# Patient Record
Sex: MALE | Race: WHITE | NOT HISPANIC OR LATINO | Employment: FULL TIME | URBAN - METROPOLITAN AREA
[De-identification: names, ages, dates, MRNs, and addresses within clinical notes are randomized per-mention and may not be internally consistent; named-entity substitution may affect disease eponyms.]

---

## 2019-06-05 NOTE — PROGRESS NOTES
This note was created by combination of typed  and Dragon dictation.  Transcription errors may be present.  If there are any questions, please contact me.    Assessment & Plan:   Essential hypertension  -stable on lisinopril 20  -     lisinopril (PRINIVIL,ZESTRIL) 20 MG tablet; Take 1 tablet (20 mg total) by mouth once daily.  Dispense: 90 tablet; Refill: 3    Male erectile disorder  -trial of sildenafil higher dose can take up to 5 at a time if needed. SE profile discussed.  -     sildenafil (REVATIO) 20 mg Tab; 1-3 tablets 1 hour prior; up to 5 at a time.  Dispense: 30 tablet; Refill: 11    Smoking 1/2 PPD x 10 years  -discussed treatment options. He's wary of chantix.  Discussed nicotine patch and he's interested.  rx sent.  -     nicotine (NICODERM CQ) 14 mg/24 hr; Place 1 patch onto the skin once daily.  Dispense: 30 patch; Refill: 5  -     nicotine, polacrilex, (NICORETTE) 2 mg Gum; Take 1 each (2 mg total) by mouth as needed.  Dispense: 30 each; Refill: 5    Normal physical exam  -future labs ordered  -     Cancel: CBC auto differential; Future; Expected date: 06/06/2019  -     Cancel: Comprehensive metabolic panel; Future; Expected date: 06/06/2019  -     Cancel: Hemoglobin A1c; Future; Expected date: 06/06/2019  -     Cancel: Lipid panel; Future; Expected date: 06/06/2019  -     Cancel: PSA, Screening; Future; Expected date: 06/06/2019  -     Cancel: TSH; Future; Expected date: 06/06/2019  -     CBC auto differential; Future; Expected date: 06/06/2019  -     Comprehensive metabolic panel; Future; Expected date: 06/06/2019  -     Lipid panel; Future; Expected date: 06/06/2019  -     Hemoglobin A1c; Future; Expected date: 06/06/2019  -     TSH; Future; Expected date: 06/06/2019    Other orders  -     Cancel: Hepatitis C antibody; Future; Expected date: 06/06/2019    eROI to previous PCP    Medications Discontinued During This Encounter   Medication Reason    lisinopril (PRINIVIL,ZESTRIL) 20 MG  tablet Reorder       meds sent this encounter:  Medications Ordered This Encounter   Medications    lisinopril (PRINIVIL,ZESTRIL) 20 MG tablet     Sig: Take 1 tablet (20 mg total) by mouth once daily.     Dispense:  90 tablet     Refill:  3    nicotine (NICODERM CQ) 14 mg/24 hr     Sig: Place 1 patch onto the skin once daily.     Dispense:  30 patch     Refill:  5    nicotine, polacrilex, (NICORETTE) 2 mg Gum     Sig: Take 1 each (2 mg total) by mouth as needed.     Dispense:  30 each     Refill:  5    sildenafil (REVATIO) 20 mg Tab     Si-3 tablets 1 hour prior; up to 5 at a time.     Dispense:  30 tablet     Refill:  11     Order Specific Question:   For: 1. benefits investigation, prior auth. and appeals; 2. patient financial assistance; and 3. patient education and medication management send to [facility] to fill the prescription.     Answer:   No, I will select a different specialty pharmacy       Follow Up: No follow-ups on file. PEx 1 year pre PEx lab ordered    Subjective:     Chief Complaint   Patient presents with    Annual Exam       HPI  Otis Osorio is a 54 y.o. male, last appointment with this clinic was Visit date not found.    NP; recently moved from [facility]. Formerly Dr. Candace Mota    HTN, lisinopril x about 3 years. Has home readings - 120s typically.  Denies SE of medication. Takes regularly.  Exercises regularly.  Smoking 1/2 PPD x 10 years.  Has chantix but never took it. No chronic cough or chest pain or dyspnea.  ED, sildenafil 20 mg ineffective. Has not tried higher dose.   No hx of lipid disorder.  Hx of stool card testing normal. Done recently.    Answers for HPI/ROS submitted by the patient on 2019   activity change: No  unexpected weight change: No  rhinorrhea: No  trouble swallowing: No  visual disturbance: No  chest tightness: No  polyuria: No  difficulty urinating: No  joint swelling: No  arthralgias: No  confusion: No  dysphoric mood:  No      Patient Care Team:  Primary Doctor No as PCP - General    Patient Active Problem List    Diagnosis Date Noted    Essential hypertension 06/06/2019    Male erectile disorder 06/06/2019    Smoking 1/2 PPD x 10 years 06/06/2019       PAST MEDICAL HISTORY:  Past Medical History:   Diagnosis Date    Hypertension        PAST SURGICAL HISTORY:  History reviewed. No pertinent surgical history.    Family History   Problem Relation Age of Onset    Heart disease Mother     Cancer Father         stomach    No Known Problems Sister     Hypertension Brother     Hypertension Brother     Hypertension Brother     No Known Problems Son     No Known Problems Daughter        SOCIAL HISTORY:  Social History     Socioeconomic History    Marital status: Single     Spouse name: Not on file    Number of children: Not on file    Years of education: Not on file    Highest education level: Not on file   Occupational History    Occupation: manager     Employer: [employer]   Social Needs    Financial resource strain: Not hard at all    Food insecurity:     Worry: Never true     Inability: Never true    Transportation needs:     Medical: No     Non-medical: No   Tobacco Use    Smoking status: Current Every Day Smoker   Substance and Sexual Activity    Alcohol use: Never     Frequency: Never     Drinks per session: Patient refused     Binge frequency: Never    Drug use: Never    Sexual activity: Yes   Lifestyle    Physical activity:     Days per week: 5 days     Minutes per session: 150+ min    Stress: Not at all   Relationships    Social connections:     Talks on phone: More than three times a week     Gets together: Twice a week     Attends Protestant service: Not on file     Active member of club or organization: No     Attends meetings of clubs or organizations: Never     Relationship status:    Other Topics Concern    Not on file   Social History Narrative    Not on file  "      ALLERGIES AND MEDICATIONS: updated and reviewed.  Review of patient's allergies indicates:   Allergen Reactions    Penicillins      Current Outpatient Medications   Medication Sig Dispense Refill    lisinopril (PRINIVIL,ZESTRIL) 20 MG tablet        No current facility-administered medications for this visit.        Review of Systems   HENT: Negative for hearing loss.    Eyes: Negative for discharge.   Respiratory: Negative for wheezing.    Cardiovascular: Negative for chest pain and palpitations.   Gastrointestinal: Negative for blood in stool, constipation, diarrhea and vomiting.   Genitourinary: Negative for hematuria and urgency.   Musculoskeletal: Negative for neck pain.   Neurological: Negative for weakness and headaches.   Endo/Heme/Allergies: Negative for polydipsia.       Objective:   Physical Exam  Vitals:    06/06/19 0810   Pulse: 74   SpO2: 98%   Weight: 69.9 kg (154 lb)   Height: 5' 7" (1.702 m)    Body mass index is 24.12 kg/m².  Weight: 69.9 kg (154 lb)   Height: 5' 7" (170.2 cm)     Physical Exam   Constitutional: He is oriented to person, place, and time. He appears well-developed and well-nourished.   HENT:   Right Ear: Tympanic membrane and external ear normal.   Left Ear: Tympanic membrane and external ear normal.   Nose: Nose normal.   Mouth/Throat: Oropharynx is clear and moist.   Eyes: EOM are normal.   Neck: Trachea normal. Carotid bruit is not present. No thyroid mass and no thyromegaly present.   Cardiovascular: Normal rate, regular rhythm, S1 normal, S2 normal, normal heart sounds and intact distal pulses.   No murmur heard.  Pulmonary/Chest: Effort normal and breath sounds normal.   Abdominal: Soft. He exhibits no mass. There is no splenomegaly or hepatomegaly. There is no tenderness.   Musculoskeletal: He exhibits no edema or deformity.   Lymphadenopathy:     He has no cervical adenopathy.     He has no axillary adenopathy.   Neurological: He is alert and oriented to person, " place, and time. He has normal strength and normal reflexes. No cranial nerve deficit or sensory deficit.   Skin: Skin is warm and dry. No rash (on exposed skin) noted.   On exposed skin   Psychiatric: He has a normal mood and affect. His speech is normal and behavior is normal. Thought content normal.

## 2019-06-06 ENCOUNTER — OFFICE VISIT (OUTPATIENT)
Dept: FAMILY MEDICINE | Facility: CLINIC | Age: 54
End: 2019-06-06
Payer: COMMERCIAL

## 2019-06-06 VITALS
WEIGHT: 154 LBS | HEART RATE: 74 BPM | SYSTOLIC BLOOD PRESSURE: 112 MMHG | OXYGEN SATURATION: 98 % | TEMPERATURE: 98 F | DIASTOLIC BLOOD PRESSURE: 80 MMHG | BODY MASS INDEX: 24.17 KG/M2 | HEIGHT: 67 IN

## 2019-06-06 DIAGNOSIS — Z00.00 NORMAL PHYSICAL EXAM: ICD-10-CM

## 2019-06-06 DIAGNOSIS — F17.200 SMOKING: ICD-10-CM

## 2019-06-06 DIAGNOSIS — N52.9 MALE ERECTILE DISORDER: ICD-10-CM

## 2019-06-06 DIAGNOSIS — I10 ESSENTIAL HYPERTENSION: Primary | ICD-10-CM

## 2019-06-06 PROCEDURE — 99999 PR PBB SHADOW E&M-EST. PATIENT-LVL III: CPT | Mod: PBBFAC,,, | Performed by: INTERNAL MEDICINE

## 2019-06-06 PROCEDURE — 99203 OFFICE O/P NEW LOW 30 MIN: CPT | Mod: S$GLB,,, | Performed by: INTERNAL MEDICINE

## 2019-06-06 PROCEDURE — 3074F SYST BP LT 130 MM HG: CPT | Mod: CPTII,S$GLB,, | Performed by: INTERNAL MEDICINE

## 2019-06-06 PROCEDURE — 3008F BODY MASS INDEX DOCD: CPT | Mod: CPTII,S$GLB,, | Performed by: INTERNAL MEDICINE

## 2019-06-06 PROCEDURE — 3079F PR MOST RECENT DIASTOLIC BLOOD PRESSURE 80-89 MM HG: ICD-10-PCS | Mod: CPTII,S$GLB,, | Performed by: INTERNAL MEDICINE

## 2019-06-06 PROCEDURE — 99203 PR OFFICE/OUTPT VISIT, NEW, LEVL III, 30-44 MIN: ICD-10-PCS | Mod: S$GLB,,, | Performed by: INTERNAL MEDICINE

## 2019-06-06 PROCEDURE — 99999 PR PBB SHADOW E&M-EST. PATIENT-LVL III: ICD-10-PCS | Mod: PBBFAC,,, | Performed by: INTERNAL MEDICINE

## 2019-06-06 PROCEDURE — 3079F DIAST BP 80-89 MM HG: CPT | Mod: CPTII,S$GLB,, | Performed by: INTERNAL MEDICINE

## 2019-06-06 PROCEDURE — 3074F PR MOST RECENT SYSTOLIC BLOOD PRESSURE < 130 MM HG: ICD-10-PCS | Mod: CPTII,S$GLB,, | Performed by: INTERNAL MEDICINE

## 2019-06-06 PROCEDURE — 3008F PR BODY MASS INDEX (BMI) DOCUMENTED: ICD-10-PCS | Mod: CPTII,S$GLB,, | Performed by: INTERNAL MEDICINE

## 2019-06-06 RX ORDER — IBUPROFEN 200 MG
1 TABLET ORAL DAILY
Qty: 30 PATCH | Refills: 5 | Status: SHIPPED | OUTPATIENT
Start: 2019-06-06

## 2019-06-06 RX ORDER — MICONAZOLE NITRATE 2 %
2 CREAM (GRAM) TOPICAL
Qty: 30 EACH | Refills: 5 | Status: SHIPPED | OUTPATIENT
Start: 2019-06-06

## 2019-06-06 RX ORDER — LISINOPRIL 20 MG/1
20 TABLET ORAL DAILY
Qty: 90 TABLET | Refills: 3 | Status: SHIPPED | OUTPATIENT
Start: 2019-06-06 | End: 2019-09-13 | Stop reason: SDUPTHER

## 2019-06-06 RX ORDER — LISINOPRIL 20 MG/1
TABLET ORAL
COMMUNITY
End: 2019-06-06 | Stop reason: SDUPTHER

## 2019-06-06 RX ORDER — SILDENAFIL CITRATE 20 MG/1
TABLET ORAL
Qty: 30 TABLET | Refills: 11 | Status: SHIPPED | OUTPATIENT
Start: 2019-06-06 | End: 2020-06-08 | Stop reason: SDUPTHER

## 2019-06-06 NOTE — LETTER
June 6, 2019    Dr. Candace Mota             [facility]  [address] [place] [address]  [place] [address]  Phone: [phone]  Fax: [phone] June 6, 2019     Patient: Otis Osorio    YOB: 1965   Date of Visit: 6/6/2019     To Whom It May Concern:    We are seeing Otis Osorio in the clinic at [facility].  Saji Butler MD is the patient's PCP.  She/He has an outstanding lab/procedure at the time we reviewed his/her chart.  To help with our Health Maintenance records will you please supply the following report(s)       (  )  MAMMOGRAM                                             (  )  COLONOSCOPY      (  )  PAP SMEAR                                                 (  )  OUTSIDE LAB RESULTS     (  )  DEXA SCAN                                                 (  )  EYE EXAM            (  )  FOOT EXAM                                                 ( x )  ENTIRE RECORD     (  )  OUTSIDE IMMUNIZATIONS                        (  )  _______________         Please Fax to [facility], Marvin P Dair, [phone]     If any questions please contact Azra at [phone]

## 2019-06-07 DIAGNOSIS — Z12.11 COLON CANCER SCREENING: ICD-10-CM

## 2019-07-12 DIAGNOSIS — Z11.59 NEED FOR HEPATITIS C SCREENING TEST: ICD-10-CM

## 2019-09-09 NOTE — PROGRESS NOTES
This note was created by combination of typed  and Dragon dictation.  Transcription errors may be present.  If there are any questions, please contact me.    Assessment & Plan:   Plantar fasciitis of left foot  -recommended self-directed stretching, NSAIDs, cold packs, over-the-counter heel question with arch support.  In flow sheet provided.  If no improvement next step would be to see Podiatry and he can contact me if he needs referral    Fasting today, labs ordered.    Need for vaccination for Strep pneumoniae  -     (In Office Administered) Pneumococcal Polysaccharide Vaccine (23 Valent) (SQ/IM)    Needs flu shot  -     Influenza - Quadrivalent (3 years & older) (PF)    There are no discontinued medications.    meds sent this encounter:       Follow Up: No follow-ups on file.    Subjective:     Chief Complaint   Patient presents with    Foot Pain (aka HEEL)     times a month        HPI  Otis Osorio is a 54 y.o. male, last appointment with this clinic was 6/6/2019.    Last seen 6/2019  HTN stable  Smoking, nicotine patch.  He was wary of chantix.    C/o left heel pain x 1 month. Constant. Worse with walking; plantar heel. Does not walk barefoot.  When he is not working he may wear flip-flops.  Standing long hours with restaurant work. Taking ibuprofen for this.     Had a fitkit done in [place] in February - negative.  We talked about colonoscopy and he would prefer to stay with fit kit for the time being.    Still smoking, half pack a day.    Answers for HPI/ROS submitted by the patient on 9/9/2019   activity change: No  unexpected weight change: No  rhinorrhea: No  trouble swallowing: No  visual disturbance: No  chest tightness: No  polyuria: No  difficulty urinating: No  joint swelling: No  arthralgias: No  confusion: No  dysphoric mood: No      Patient Care Team:  Saji Butler MD as PCP - General (Internal Medicine)  Candace Carlos Enriquejoselyn    Patient Active Problem List    Diagnosis Date Noted     Essential hypertension 06/06/2019    Male erectile disorder 06/06/2019    Smoking 1/2 PPD x 10 years 06/06/2019       PAST MEDICAL HISTORY:  Past Medical History:   Diagnosis Date    Hypertension        PAST SURGICAL HISTORY:  History reviewed. No pertinent surgical history.    SOCIAL HISTORY:  Social History     Socioeconomic History    Marital status: Single     Spouse name: Not on file    Number of children: Not on file    Years of education: Not on file    Highest education level: Not on file   Occupational History    Occupation: manager     Employer: [employer]   Social Needs    Financial resource strain: Not hard at all    Food insecurity:     Worry: Never true     Inability: Never true    Transportation needs:     Medical: No     Non-medical: No   Tobacco Use    Smoking status: Current Every Day Smoker   Substance and Sexual Activity    Alcohol use: Never     Frequency: Never     Drinks per session: Patient refused     Binge frequency: Never    Drug use: Never    Sexual activity: Yes   Lifestyle    Physical activity:     Days per week: 5 days     Minutes per session: 150+ min    Stress: Not at all   Relationships    Social connections:     Talks on phone: More than three times a week     Gets together: Twice a week     Attends Pentecostalism service: Not on file     Active member of club or organization: No     Attends meetings of clubs or organizations: Never     Relationship status:    Other Topics Concern    Not on file   Social History Narrative    Not on file       ALLERGIES AND MEDICATIONS: updated and reviewed.  Review of patient's allergies indicates:   Allergen Reactions    Penicillins      Current Outpatient Medications   Medication Sig Dispense Refill    lisinopril (PRINIVIL,ZESTRIL) 20 MG tablet Take 1 tablet (20 mg total) by mouth once daily. 90 tablet 3    sildenafil (REVATIO) 20 mg Tab 1-3 tablets 1 hour prior; up to 5 at a time. 30 tablet 11     "nicotine (NICODERM CQ) 14 mg/24 hr Place 1 patch onto the skin once daily. 30 patch 5    nicotine, polacrilex, (NICORETTE) 2 mg Gum Take 1 each (2 mg total) by mouth as needed. 30 each 5     No current facility-administered medications for this visit.        Review of Systems   HENT: Negative for hearing loss.    Eyes: Negative for discharge.   Respiratory: Negative for wheezing.    Cardiovascular: Negative for chest pain and palpitations.   Gastrointestinal: Negative for blood in stool, constipation, diarrhea and vomiting.   Genitourinary: Negative for hematuria and urgency.   Musculoskeletal: Negative for neck pain.   Neurological: Negative for weakness and headaches.   Endo/Heme/Allergies: Negative for polydipsia.       Objective:   Physical Exam   Vitals:    09/10/19 1128   BP: 106/70   BP Location: Left arm   Patient Position: Sitting   BP Method: Medium (Manual)   Pulse: 75   Temp: 97.9 °F (36.6 °C)   TempSrc: Oral   SpO2: 96%   Weight: 71.1 kg (156 lb 10.2 oz)   Height: 5' 7" (1.702 m)    Body mass index is 24.53 kg/m².  Weight: 71.1 kg (156 lb 10.2 oz)   Height: 5' 7" (170.2 cm)     Physical Exam   Constitutional: He is oriented to person, place, and time. He appears well-developed and well-nourished. No distress.   HENT:   Head: Normocephalic and atraumatic.   Eyes: No scleral icterus.   Pulmonary/Chest: Effort normal.   Musculoskeletal:   Left ankle unremarkable no swelling no deformity no redness.  The forefoot unremarkable.  Dorsal foot unremarkable.  Plantar heel tender with palpation, no nodules no induration no redness no deformity   Neurological: He is alert and oriented to person, place, and time.   Skin: Skin is warm and dry.   Psychiatric: He has a normal mood and affect. His behavior is normal. Thought content normal.     "

## 2019-09-10 ENCOUNTER — LAB VISIT (OUTPATIENT)
Dept: LAB | Facility: HOSPITAL | Age: 54
End: 2019-09-10
Attending: INTERNAL MEDICINE
Payer: COMMERCIAL

## 2019-09-10 ENCOUNTER — OFFICE VISIT (OUTPATIENT)
Dept: FAMILY MEDICINE | Facility: CLINIC | Age: 54
End: 2019-09-10
Payer: COMMERCIAL

## 2019-09-10 VITALS
OXYGEN SATURATION: 96 % | DIASTOLIC BLOOD PRESSURE: 70 MMHG | HEIGHT: 67 IN | HEART RATE: 75 BPM | BODY MASS INDEX: 24.58 KG/M2 | SYSTOLIC BLOOD PRESSURE: 106 MMHG | TEMPERATURE: 98 F | WEIGHT: 156.63 LBS

## 2019-09-10 DIAGNOSIS — Z23 NEEDS FLU SHOT: ICD-10-CM

## 2019-09-10 DIAGNOSIS — Z00.00 NORMAL PHYSICAL EXAM: ICD-10-CM

## 2019-09-10 DIAGNOSIS — M72.2 PLANTAR FASCIITIS OF LEFT FOOT: Primary | ICD-10-CM

## 2019-09-10 DIAGNOSIS — Z11.59 NEED FOR HEPATITIS C SCREENING TEST: ICD-10-CM

## 2019-09-10 DIAGNOSIS — Z23 NEED FOR VACCINATION FOR STREP PNEUMONIAE: ICD-10-CM

## 2019-09-10 LAB
ALBUMIN SERPL BCP-MCNC: 4 G/DL (ref 3.5–5.2)
ALP SERPL-CCNC: 66 U/L (ref 55–135)
ALT SERPL W/O P-5'-P-CCNC: 16 U/L (ref 10–44)
ANION GAP SERPL CALC-SCNC: 7 MMOL/L (ref 8–16)
AST SERPL-CCNC: 16 U/L (ref 10–40)
BASOPHILS # BLD AUTO: 0.01 K/UL (ref 0–0.2)
BASOPHILS NFR BLD: 0.2 % (ref 0–1.9)
BILIRUB SERPL-MCNC: 0.5 MG/DL (ref 0.1–1)
BUN SERPL-MCNC: 22 MG/DL (ref 6–20)
CALCIUM SERPL-MCNC: 9.3 MG/DL (ref 8.7–10.5)
CHLORIDE SERPL-SCNC: 104 MMOL/L (ref 95–110)
CHOLEST SERPL-MCNC: 168 MG/DL (ref 120–199)
CHOLEST/HDLC SERPL: 4.1 {RATIO} (ref 2–5)
CO2 SERPL-SCNC: 26 MMOL/L (ref 23–29)
CREAT SERPL-MCNC: 1.3 MG/DL (ref 0.5–1.4)
DIFFERENTIAL METHOD: ABNORMAL
EOSINOPHIL # BLD AUTO: 0.1 K/UL (ref 0–0.5)
EOSINOPHIL NFR BLD: 0.9 % (ref 0–8)
ERYTHROCYTE [DISTWIDTH] IN BLOOD BY AUTOMATED COUNT: 12.6 % (ref 11.5–14.5)
EST. GFR  (AFRICAN AMERICAN): >60 ML/MIN/1.73 M^2
EST. GFR  (NON AFRICAN AMERICAN): >60 ML/MIN/1.73 M^2
ESTIMATED AVG GLUCOSE: 105 MG/DL (ref 68–131)
GLUCOSE SERPL-MCNC: 96 MG/DL (ref 70–110)
HBA1C MFR BLD HPLC: 5.3 % (ref 4–5.6)
HCT VFR BLD AUTO: 47.4 % (ref 40–54)
HDLC SERPL-MCNC: 41 MG/DL (ref 40–75)
HDLC SERPL: 24.4 % (ref 20–50)
HGB BLD-MCNC: 15.5 G/DL (ref 14–18)
IMM GRANULOCYTES # BLD AUTO: 0.03 K/UL (ref 0–0.04)
IMM GRANULOCYTES NFR BLD AUTO: 0.5 % (ref 0–0.5)
LDLC SERPL CALC-MCNC: 106 MG/DL (ref 63–159)
LYMPHOCYTES # BLD AUTO: 1.9 K/UL (ref 1–4.8)
LYMPHOCYTES NFR BLD: 28.5 % (ref 18–48)
MCH RBC QN AUTO: 32.4 PG (ref 27–31)
MCHC RBC AUTO-ENTMCNC: 32.7 G/DL (ref 32–36)
MCV RBC AUTO: 99 FL (ref 82–98)
MONOCYTES # BLD AUTO: 0.5 K/UL (ref 0.3–1)
MONOCYTES NFR BLD: 7.3 % (ref 4–15)
NEUTROPHILS # BLD AUTO: 4.1 K/UL (ref 1.8–7.7)
NEUTROPHILS NFR BLD: 62.6 % (ref 38–73)
NONHDLC SERPL-MCNC: 127 MG/DL
NRBC BLD-RTO: 0 /100 WBC
PLATELET # BLD AUTO: 203 K/UL (ref 150–350)
PMV BLD AUTO: 10 FL (ref 9.2–12.9)
POTASSIUM SERPL-SCNC: 5 MMOL/L (ref 3.5–5.1)
PROT SERPL-MCNC: 7 G/DL (ref 6–8.4)
RBC # BLD AUTO: 4.79 M/UL (ref 4.6–6.2)
SODIUM SERPL-SCNC: 137 MMOL/L (ref 136–145)
TRIGL SERPL-MCNC: 105 MG/DL (ref 30–150)
TSH SERPL DL<=0.005 MIU/L-ACNC: 1.11 UIU/ML (ref 0.4–4)
WBC # BLD AUTO: 6.56 K/UL (ref 3.9–12.7)

## 2019-09-10 PROCEDURE — 90472 IMMUNIZATION ADMIN EACH ADD: CPT | Mod: S$GLB,,, | Performed by: INTERNAL MEDICINE

## 2019-09-10 PROCEDURE — 3008F PR BODY MASS INDEX (BMI) DOCUMENTED: ICD-10-PCS | Mod: CPTII,S$GLB,, | Performed by: INTERNAL MEDICINE

## 2019-09-10 PROCEDURE — 99213 OFFICE O/P EST LOW 20 MIN: CPT | Mod: 25,S$GLB,, | Performed by: INTERNAL MEDICINE

## 2019-09-10 PROCEDURE — 3074F SYST BP LT 130 MM HG: CPT | Mod: CPTII,S$GLB,, | Performed by: INTERNAL MEDICINE

## 2019-09-10 PROCEDURE — 86803 HEPATITIS C AB TEST: CPT

## 2019-09-10 PROCEDURE — 84443 ASSAY THYROID STIM HORMONE: CPT

## 2019-09-10 PROCEDURE — 85025 COMPLETE CBC W/AUTO DIFF WBC: CPT

## 2019-09-10 PROCEDURE — 3008F BODY MASS INDEX DOCD: CPT | Mod: CPTII,S$GLB,, | Performed by: INTERNAL MEDICINE

## 2019-09-10 PROCEDURE — 90471 IMMUNIZATION ADMIN: CPT | Mod: S$GLB,,, | Performed by: INTERNAL MEDICINE

## 2019-09-10 PROCEDURE — 80053 COMPREHEN METABOLIC PANEL: CPT

## 2019-09-10 PROCEDURE — 36415 COLL VENOUS BLD VENIPUNCTURE: CPT | Mod: PO

## 2019-09-10 PROCEDURE — 83036 HEMOGLOBIN GLYCOSYLATED A1C: CPT

## 2019-09-10 PROCEDURE — 90471 FLU VACCINE (QUAD) GREATER THAN OR EQUAL TO 3YO PRESERVATIVE FREE IM: ICD-10-PCS | Mod: S$GLB,,, | Performed by: INTERNAL MEDICINE

## 2019-09-10 PROCEDURE — 3078F PR MOST RECENT DIASTOLIC BLOOD PRESSURE < 80 MM HG: ICD-10-PCS | Mod: CPTII,S$GLB,, | Performed by: INTERNAL MEDICINE

## 2019-09-10 PROCEDURE — 3074F PR MOST RECENT SYSTOLIC BLOOD PRESSURE < 130 MM HG: ICD-10-PCS | Mod: CPTII,S$GLB,, | Performed by: INTERNAL MEDICINE

## 2019-09-10 PROCEDURE — 90732 PPSV23 VACC 2 YRS+ SUBQ/IM: CPT | Mod: S$GLB,,, | Performed by: INTERNAL MEDICINE

## 2019-09-10 PROCEDURE — 90732 PNEUMOCOCCAL POLYSACCHARIDE VACCINE 23-VALENT =>2YO SQ IM: ICD-10-PCS | Mod: S$GLB,,, | Performed by: INTERNAL MEDICINE

## 2019-09-10 PROCEDURE — 99999 PR PBB SHADOW E&M-EST. PATIENT-LVL IV: CPT | Mod: PBBFAC,,, | Performed by: INTERNAL MEDICINE

## 2019-09-10 PROCEDURE — 90686 IIV4 VACC NO PRSV 0.5 ML IM: CPT | Mod: S$GLB,,, | Performed by: INTERNAL MEDICINE

## 2019-09-10 PROCEDURE — 3078F DIAST BP <80 MM HG: CPT | Mod: CPTII,S$GLB,, | Performed by: INTERNAL MEDICINE

## 2019-09-10 PROCEDURE — 80061 LIPID PANEL: CPT

## 2019-09-10 PROCEDURE — 99999 PR PBB SHADOW E&M-EST. PATIENT-LVL IV: ICD-10-PCS | Mod: PBBFAC,,, | Performed by: INTERNAL MEDICINE

## 2019-09-10 PROCEDURE — 90686 FLU VACCINE (QUAD) GREATER THAN OR EQUAL TO 3YO PRESERVATIVE FREE IM: ICD-10-PCS | Mod: S$GLB,,, | Performed by: INTERNAL MEDICINE

## 2019-09-10 PROCEDURE — 90472 PNEUMOCOCCAL POLYSACCHARIDE VACCINE 23-VALENT =>2YO SQ IM: ICD-10-PCS | Mod: S$GLB,,, | Performed by: INTERNAL MEDICINE

## 2019-09-10 PROCEDURE — 99213 PR OFFICE/OUTPT VISIT, EST, LEVL III, 20-29 MIN: ICD-10-PCS | Mod: 25,S$GLB,, | Performed by: INTERNAL MEDICINE

## 2019-09-10 NOTE — PATIENT INSTRUCTIONS
Treating Plantar Fasciitis  First, your healthcare provider tries to determine the cause of your problem in order to suggest ways to relieve pain. If your pain is due to poor foot mechanics, custom-made shoe inserts (orthoses) may help.    Reduce symptoms  · To relieve mild symptoms, try aspirin, ibuprofen, or other medicines as directed. Rubbing ice on the affected area may also help.  · To reduce severe pain and swelling, your healthcare provider may prescribe pills or injections or a walking cast in some instances. Physical therapy, such as ultrasound or a daily stretching program, may also be recommended. Surgery is rarely required.  · To reduce symptoms caused by poor foot mechanics, your foot may be taped. This supports the arch and temporarily controls movement. Night splints may also help by stretching the fascia.  Control movement  If taping helps, your healthcare provider may prescribe orthoses. Built from plaster casts of your feet, these inserts control the way your foot moves. As a result, your symptoms should go away.  Reduce overuse  Every time your foot strikes the ground, the plantar fascia is stretched. You can reduce the strain on the plantar fascia and the possibility of overuse by following these suggestions:  · Lose any excess weight.  · Avoid running on hard or uneven ground.  · Use orthoses at all times in your shoes and house slippers.  If surgery is needed  Your healthcare provider may consider surgery if other types of treatment don't control your pain. During surgery, the plantar fascia is partially cut to release tension. As you heal, fibrous tissue fills the space between the heel bone and the plantar fascia.   Date Last Reviewed: 10/14/2015  © 2419-7470 [employer]. [address] [place] [address], [place] [address]. All rights reserved. This information is not intended as a substitute for professional medical care. Always follow your healthcare professional's  instructions.        Understanding Plantar Fasciitis    Plantar fasciitis is a condition that causes foot and heel pain. The plantar fascia is a tough band of tissue that runs across the bottom of the foot from the heel to the toes. This tissue pulls on the heel bone. It supports the arch of the foot as it pushes off the ground. If the tissue becomes irritated or red and swollen (inflamed), it is called plantar fasciitis.  How to say it  PLAN-tuhr [id number]-IY-[id number]   What causes plantar fasciitis?  Plantar fasciitis most often occurs from overusing the plantar fascia. The tissue may become damaged from activities that put repeated stress on the heel and foot. Or it may wear down over time with age and ankle stiffness. You are more likely to have plantar fasciitis if you:  · Do activities that require a lot of running, jumping, or dancing  · Have a job that requires being on your feet for long periods  · Are overweight or obese  · Have certain foot problems, such as a tight Achilles tendon, flat feet, or high arches  · Often wear poorly fitting shoes  Symptoms of plantar fasciitis  The condition most often causes pain in the heel and the bottom of the foot. The pain may occur when you take your first steps in the morning. It may get better as you walk throughout the day. But as you continue to put weight on the foot, the pain often returns. Pain may also occur after standing or sitting for long periods.  Treating plantar fasciitis  Treatments for plantar fasciitis include:  · Resting the foot. This involves limiting movements that make your foot hurt. You may also need to avoid certain sports and types of work for a time.  · Using cold packs. Put an ice pack on the heel and foot to help reduce pain and swelling.  · Taking pain medicines. Prescription and over-the-counter pain medicines can help relieve pain and swelling.  · Using heel cups or foot inserts (orthotics). These are placed in the shoes to help support the  heel or arch and cushion the heel. You may also be told to buy proper-fitting shoes with good arch support and cushioned soles.  · Taping the foot. This supports the arch and limits the movement of the plantar fascia to help relieve symptoms.  · Wearing a night splint. This stretches the plantar fascia and leg muscles while you sleep. This may help relieve pain.  · Doing exercises and physical therapy. These stretch and strengthen the plantar fascia and the muscles in the leg that support the heel and foot.  · Getting shots of medicine into the foot. These may help relieve symptoms for a time.  · Having surgery. This may be needed if other treatments fail to relieve symptoms. During surgery, the surgeon may partially cut the plantar fascia to release tension.  Possible complications of plantar fasciitis  Without proper care and treatment, healing may take longer than normal. Also, symptoms may continue or get worse. Over time, the plantar fascia may be damaged. This can make it hard to walk or even stand without pain.  When to call your healthcare provider  Call your healthcare provider right away if you have any of these:  · Fever of 100.4°F (38°C) or higher, or as directed  · Symptoms that dont get better with treatment, or get worse  · New symptoms, such as numbness, tingling, or weakness in the foot   Date Last Reviewed: 3/10/2016  © 9217-6594 [employer]. [address] [place] [address], [place] [address]. All rights reserved. This information is not intended as a substitute for professional medical care. Always follow your healthcare professional's instructions.

## 2019-09-10 NOTE — LETTER
September 10, 2019    Dr. Mota               [facility]  [address] [place] [address]  Phone: [phone]  Fax: [phone] September 10, 2019     Patient: Otis Osorio    YOB: 1965   Date of Visit: 9/10/2019     AUTHORIZATION FOR RELEASE OF   CONFIDENTIAL INFORMATION     Dear Dr. Mota,     We are seeing Otis Osorio, date of birth 1965, in the clinic at [facility]/ INTERNAL MED/ PEDS. Saji Butler MD is the patient's PCP. Otis Osorio  has an outstanding lab/procedure at the time we reviewed him chart. In order to help keep his health information updated, he has authorized us to request the following medical record(s):          (  )  MAMMOGRAM                                      (  )  COLONOSCOPY      (  )  PAP SMEAR                                          (  )  OUTSIDE LAB RESULTS      (  )  DEXA SCAN                                          (  )  EYE EXAM             (  )  FOOT EXAM                                          ( x )  ENTIRE RECORD      (  )  OUTSIDE IMMUNIZATIONS                 (  )  _______________            Please fax records to [facility], Marvin P Dair, MD,  [phone] [address] [place] [address]         If you have any questions, please contact Azra at [phone].

## 2019-09-10 NOTE — PROGRESS NOTES
Patient received Pneumococcal vaccine and Flu vaccine tolerated it well. Patient received VIS information.

## 2019-09-11 LAB — HCV AB SERPL QL IA: NEGATIVE

## 2019-09-11 NOTE — PROGRESS NOTES
Borderline lipid with risk factors; hcv negative. Remainder WNL  The 10-year ASCVD risk score (Joaquim BLOOD Jr., et al., 2013) is: 8%    Results to pt via [facility]. Working on cutting down smoking. No changes. Follow up 6 months.

## 2019-09-13 DIAGNOSIS — I10 ESSENTIAL HYPERTENSION: ICD-10-CM

## 2019-09-13 RX ORDER — LISINOPRIL 20 MG/1
20 TABLET ORAL DAILY
Qty: 90 TABLET | Refills: 3 | Status: SHIPPED | OUTPATIENT
Start: 2019-09-13 | End: 2020-09-21

## 2019-09-13 NOTE — TELEPHONE ENCOUNTER
----- Message from Reva Magdaleno sent at 9/13/2019 11:32 AM CDT -----  Contact: [facility]  Type: RX Refill Request    Who Called:[facility]    Refill or New Rx:Refill request    RX Name and Strength:lisinopril (PRINIVIL,ZESTRIL) 20 MG tablet 90 tablet     How is the patient currently taking it? (ex. 1XDay):1xday    Is this a 30 day or 90 day RX:90    Preferred Pharmacy with phone number:[facility] - [address] [phone] (Phone)  [phone] (Fax)    Local or Mail Order:Mail    Ordering Provider:Dair    Would the patient rather a call back or a response via [facility]? Call    Best Call Back Number:[phone] ref# [id number]    Additional Information: [facility] called to check the status of refill request faxed over to the office. Please call with update.

## 2019-10-08 ENCOUNTER — DOCUMENTATION ONLY (OUTPATIENT)
Dept: FAMILY MEDICINE | Facility: CLINIC | Age: 54
End: 2019-10-08

## 2019-10-08 PROBLEM — Z86.59 HISTORY OF ANXIETY: Status: ACTIVE | Noted: 2019-10-08

## 2019-10-08 PROBLEM — Z87.898 HISTORY OF ALCOHOL USE DISORDER: Status: ACTIVE | Noted: 2019-10-08

## 2019-10-08 NOTE — PROGRESS NOTES
Old records from Dr. Ermelinda Mota  History of alcohol use disorder with subsequent elevated LFTs and mild thrombocytopenia  Hypertension on ACE-inhibitor, lisinopril 20  Smoking  Erectile dysfunction, sildenafil 20  Anxiety with history of trazodone and hydroxyzine  Fit kit 6/20/18      03/15/2017 metabolic panel 141/4.1/105/30/11/1.0 glucose 72    alk-phos 78 total bili 0.6 total protein 6.9 albumin 3.6  TSH 3.39  CBC 3 0.4/14.7/42.2/120  RDW 13  RPR negative  Urinalysis negative    04/12/2017 CBC 10 0.4/15.3/45.6/227  RDW 13.8  143/4.4/102/21/18/1.05  AST 20 ALT 20 alk-phos 74 total bili 0.4 total protein 6.6 albumin 4.1   TG 98 HDL 48 LDL 79

## 2020-02-26 ENCOUNTER — OFFICE VISIT (OUTPATIENT)
Dept: PRIMARY CARE CLINIC | Facility: CLINIC | Age: 55
End: 2020-02-26
Payer: COMMERCIAL

## 2020-02-26 VITALS
WEIGHT: 156.5 LBS | HEIGHT: 67 IN | OXYGEN SATURATION: 98 % | HEART RATE: 96 BPM | BODY MASS INDEX: 24.56 KG/M2 | DIASTOLIC BLOOD PRESSURE: 67 MMHG | SYSTOLIC BLOOD PRESSURE: 117 MMHG

## 2020-02-26 DIAGNOSIS — N45.1 EPIDIDYMITIS: Primary | ICD-10-CM

## 2020-02-26 PROCEDURE — 3074F SYST BP LT 130 MM HG: CPT | Mod: CPTII,S$GLB,, | Performed by: FAMILY MEDICINE

## 2020-02-26 PROCEDURE — 99999 PR PBB SHADOW E&M-EST. PATIENT-LVL III: CPT | Mod: PBBFAC,,, | Performed by: FAMILY MEDICINE

## 2020-02-26 PROCEDURE — 99999 PR PBB SHADOW E&M-EST. PATIENT-LVL III: ICD-10-PCS | Mod: PBBFAC,,, | Performed by: FAMILY MEDICINE

## 2020-02-26 PROCEDURE — 3078F DIAST BP <80 MM HG: CPT | Mod: CPTII,S$GLB,, | Performed by: FAMILY MEDICINE

## 2020-02-26 PROCEDURE — 3008F BODY MASS INDEX DOCD: CPT | Mod: CPTII,S$GLB,, | Performed by: FAMILY MEDICINE

## 2020-02-26 PROCEDURE — 3074F PR MOST RECENT SYSTOLIC BLOOD PRESSURE < 130 MM HG: ICD-10-PCS | Mod: CPTII,S$GLB,, | Performed by: FAMILY MEDICINE

## 2020-02-26 PROCEDURE — 99214 PR OFFICE/OUTPT VISIT, EST, LEVL IV, 30-39 MIN: ICD-10-PCS | Mod: S$GLB,,, | Performed by: FAMILY MEDICINE

## 2020-02-26 PROCEDURE — 3008F PR BODY MASS INDEX (BMI) DOCUMENTED: ICD-10-PCS | Mod: CPTII,S$GLB,, | Performed by: FAMILY MEDICINE

## 2020-02-26 PROCEDURE — 99214 OFFICE O/P EST MOD 30 MIN: CPT | Mod: S$GLB,,, | Performed by: FAMILY MEDICINE

## 2020-02-26 PROCEDURE — 3078F PR MOST RECENT DIASTOLIC BLOOD PRESSURE < 80 MM HG: ICD-10-PCS | Mod: CPTII,S$GLB,, | Performed by: FAMILY MEDICINE

## 2020-02-26 RX ORDER — CIPROFLOXACIN 500 MG/1
500 TABLET ORAL EVERY 12 HOURS
Qty: 28 TABLET | Refills: 0 | Status: SHIPPED | OUTPATIENT
Start: 2020-02-26

## 2020-02-27 NOTE — PROGRESS NOTES
Subjective:       Patient ID: Otis Osorio is a 54 y.o. male.    Chief Complaint: painful swelling left testicle and scrotal area  Possible mass.no pain with urination or ejaculation  No fever.  HPIsee above  Review of Systems   Constitutional: Negative for fever.   HENT: Negative.    Eyes: Negative.    Respiratory: Negative.    Cardiovascular: Negative.    Gastrointestinal: Negative.    Endocrine: Negative for polydipsia, polyphagia and polyuria.   Genitourinary: Positive for scrotal swelling and testicular pain. Negative for difficulty urinating, flank pain and frequency.   Musculoskeletal: Negative.    Skin: Positive for color change.   Neurological: Negative.    Hematological: Negative.    Psychiatric/Behavioral: Negative.        Objective:      Physical Exam   Constitutional: He is oriented to person, place, and time. He appears well-developed and well-nourished. He appears distressed.   HENT:   Head: Normocephalic and atraumatic.   Eyes: Pupils are equal, round, and reactive to light. Conjunctivae and EOM are normal.   Neck: Normal range of motion. Neck supple. No JVD present.   Pulmonary/Chest: Effort normal.   Abdominal: Soft. Bowel sounds are normal. He exhibits no distension. There is no guarding. Hernia confirmed negative in the right inguinal area and confirmed negative in the left inguinal area.   Genitourinary: Penis normal. Right testis shows no mass. Left testis shows swelling and tenderness. Left testis shows no mass. Circumcised.         Genitourinary Comments: ORANGE COLOR REPRESENTS ERYTHEMA   Musculoskeletal: Normal range of motion. He exhibits no edema, tenderness or deformity.   Neurological: He is alert and oriented to person, place, and time. He displays normal reflexes. No cranial nerve deficit or sensory deficit. He exhibits normal muscle tone. Coordination normal.   Skin: Skin is warm and dry. Capillary refill takes less than 2 seconds. No rash noted. He is not diaphoretic. There is  erythema. No pallor.   Psychiatric: He has a normal mood and affect. His behavior is normal. Judgment and thought content normal.   Nursing note and vitals reviewed.      Assessment:       1. Epididymitis     2.     Testicular swelling  Plan:     Diagnoses and all orders for this visit:    Epididymitis  -     ciprofloxacin HCl (CIPRO) 500 MG tablet; Take 1 tablet (500 mg total) by mouth every 12 (twelve) hours.

## 2020-04-22 ENCOUNTER — PATIENT OUTREACH (OUTPATIENT)
Dept: ADMINISTRATIVE | Facility: HOSPITAL | Age: 55
End: 2020-04-22

## 2020-04-27 ENCOUNTER — TELEPHONE (OUTPATIENT)
Dept: FAMILY MEDICINE | Facility: CLINIC | Age: 55
End: 2020-04-27

## 2020-05-02 ENCOUNTER — HOSPITAL ENCOUNTER (EMERGENCY)
Facility: HOSPITAL | Age: 55
Discharge: HOME | End: 2020-05-02
Attending: EMERGENCY MEDICINE

## 2020-05-02 VITALS
SYSTOLIC BLOOD PRESSURE: 131 MMHG | DIASTOLIC BLOOD PRESSURE: 79 MMHG | HEIGHT: 67 IN | WEIGHT: 160 LBS | HEART RATE: 78 BPM | RESPIRATION RATE: 18 BRPM | BODY MASS INDEX: 25.11 KG/M2 | OXYGEN SATURATION: 99 % | TEMPERATURE: 99 F

## 2020-05-02 DIAGNOSIS — T14.90XA TRAUMA: ICD-10-CM

## 2020-05-02 DIAGNOSIS — S20.212A CONTUSION OF LEFT CHEST WALL, INITIAL ENCOUNTER: Primary | ICD-10-CM

## 2020-05-02 PROCEDURE — 99283 EMERGENCY DEPT VISIT LOW MDM: CPT | Mod: 25,ER

## 2020-05-03 NOTE — ED TRIAGE NOTES
Pt here with c/o pain to left side of chest after hitting on cash drawer at work. Pt reports taking 800mg of ibuprofen 45 minutes ago with improvement of pain. Pt denies SOB.

## 2020-05-03 NOTE — ED PROVIDER NOTES
Encounter Date: 5/2/2020    SCRIBE #1 NOTE: I, Alethea Ryan, am scribing for, and in the presence of,  Dr. Hernández. I have scribed the following portions of the note - Other sections scribed: HPI, ROS, PE.       History     Chief Complaint   Patient presents with    Rib Injury     L sided rib pain after getting hit in the ribs with a cash drawer at work     Otis Osorio is a 54 y.o. male who presents to the ED complaining of left sided rib pain s/p getting ghit with a metal money drawer at work today.     The history is provided by the patient. No  was used.     Review of patient's allergies indicates:   Allergen Reactions    Penicillins      Past Medical History:   Diagnosis Date    Hypertension      History reviewed. No pertinent surgical history.  Family History   Problem Relation Age of Onset    Heart disease Mother     Cancer Father         stomach    No Known Problems Sister     Hypertension Brother     Hypertension Brother     Hypertension Brother     No Known Problems Son     No Known Problems Daughter      Social History     Tobacco Use    Smoking status: Current Every Day Smoker    Smokeless tobacco: Never Used   Substance Use Topics    Alcohol use: Never     Frequency: Never     Drinks per session: Patient refused     Binge frequency: Never    Drug use: Never     Review of Systems   Constitutional: Negative.  Negative for fever.   HENT: Negative.  Negative for sore throat.    Eyes: Negative.  Negative for pain.   Respiratory: Negative.  Negative for shortness of breath.    Cardiovascular: Negative.  Negative for chest pain.   Gastrointestinal: Negative.  Negative for abdominal pain and vomiting.   Genitourinary: Negative.  Negative for dysuria.   Musculoskeletal: Positive for arthralgias (left ribs). Negative for back pain.   Skin: Negative.  Negative for rash.   Neurological: Negative.  Negative for headaches.   Hematological: Negative.    Psychiatric/Behavioral:  Negative.    All other systems reviewed and are negative.      Physical Exam     Initial Vitals [05/02/20 2243]   BP Pulse Resp Temp SpO2   132/83 89 20 97.9 °F (36.6 °C) 99 %      MAP       --         Physical Exam    Nursing note and vitals reviewed.  Constitutional: He appears well-developed and well-nourished.   HENT:   Head: Normocephalic and atraumatic.   Eyes: Conjunctivae and EOM are normal.   Neck: Normal range of motion. Neck supple.   Cardiovascular: Normal rate and intact distal pulses.   Pulmonary/Chest: Effort normal. No respiratory distress. He exhibits tenderness.   Abdominal: Soft. There is no tenderness.   Musculoskeletal: Normal range of motion.        Arms:  Neurological: He is alert and oriented to person, place, and time.   Skin: Skin is warm and dry.   Psychiatric: He has a normal mood and affect. His behavior is normal.         ED Course   Procedures  Labs Reviewed - No data to display       Imaging Results          X-Ray Ribs 2 View Left (Final result)  Result time 05/02/20 23:16:01    Final result by Lina Drake MD (05/02/20 23:16:01)                 Impression:      As above described.      Electronically signed by: Lina Drake  Date:    05/02/2020  Time:    23:16             Narrative:    EXAMINATION:  LEFT RIBS    CLINICAL HISTORY:  Left rib pain.    TECHNIQUE:  Two views of the left ribs    COMPARISON:  None    FINDINGS:  Three views of the left ribs demonstrate nonacute fractures of left 7th, 8th and 9th ribs.  If concern for acute fractures, consider CT of the thorax.                                 Medical Decision Making:   History:   Old Medical Records: I decided to obtain old medical records.  ED Management:  This patient had a remote history of rib fractures on the left-hand side.  There patient presents with anterior chest wall pain there are no evidence of fractures in this areas.            Scribe Attestation:   Scribe #1: I performed the above scribed service and  the documentation accurately describes the services I performed. I attest to the accuracy of the note.    This document was produced by a scribe under my direction and in my presence. I agree with the content of the note and have made any necessary edits.     Camden Hernández MD    05/02/2020 11:23 PM                        Clinical Impression:     1. Contusion of left chest wall, initial encounter    2. Trauma                ED Disposition Condition    Discharge Stable        ED Prescriptions     None        Follow-up Information     Follow up With Specialties Details Why Contact Info    Saji Butler MD Internal Medicine Schedule an appointment as soon as possible for a visit in 1 week  [address] [facility]  [place] [address]  [phone]                                       Camden Hernández MD  05/02/20 7849

## 2020-06-08 ENCOUNTER — PATIENT MESSAGE (OUTPATIENT)
Dept: FAMILY MEDICINE | Facility: CLINIC | Age: 55
End: 2020-06-08

## 2020-06-08 DIAGNOSIS — N52.9 MALE ERECTILE DISORDER: ICD-10-CM

## 2020-06-08 RX ORDER — SILDENAFIL CITRATE 20 MG/1
TABLET ORAL
Qty: 30 TABLET | Refills: 2 | Status: SHIPPED | OUTPATIENT
Start: 2020-06-08

## 2020-09-11 DIAGNOSIS — I10 ESSENTIAL HYPERTENSION: ICD-10-CM

## 2020-09-11 DIAGNOSIS — Z12.11 COLON CANCER SCREENING: ICD-10-CM

## 2020-10-05 ENCOUNTER — PATIENT MESSAGE (OUTPATIENT)
Dept: ADMINISTRATIVE | Facility: HOSPITAL | Age: 55
End: 2020-10-05

## 2020-10-30 ENCOUNTER — PATIENT MESSAGE (OUTPATIENT)
Dept: ADMINISTRATIVE | Facility: HOSPITAL | Age: 55
End: 2020-10-30

## 2021-01-04 ENCOUNTER — PATIENT MESSAGE (OUTPATIENT)
Dept: ADMINISTRATIVE | Facility: HOSPITAL | Age: 56
End: 2021-01-04

## 2021-03-31 ENCOUNTER — PATIENT MESSAGE (OUTPATIENT)
Dept: ADMINISTRATIVE | Facility: HOSPITAL | Age: 56
End: 2021-03-31

## 2021-04-06 ENCOUNTER — PATIENT MESSAGE (OUTPATIENT)
Dept: ADMINISTRATIVE | Facility: HOSPITAL | Age: 56
End: 2021-04-06

## 2021-04-16 ENCOUNTER — PATIENT MESSAGE (OUTPATIENT)
Dept: RESEARCH | Facility: HOSPITAL | Age: 56
End: 2021-04-16

## 2021-07-06 ENCOUNTER — PATIENT MESSAGE (OUTPATIENT)
Dept: ADMINISTRATIVE | Facility: HOSPITAL | Age: 56
End: 2021-07-06

## 2021-07-07 ENCOUNTER — PATIENT MESSAGE (OUTPATIENT)
Dept: ADMINISTRATIVE | Facility: HOSPITAL | Age: 56
End: 2021-07-07

## 2021-10-04 ENCOUNTER — PATIENT MESSAGE (OUTPATIENT)
Dept: ADMINISTRATIVE | Facility: HOSPITAL | Age: 56
End: 2021-10-04

## 2022-05-31 ENCOUNTER — PATIENT MESSAGE (OUTPATIENT)
Dept: ADMINISTRATIVE | Facility: HOSPITAL | Age: 57
End: 2022-05-31
Payer: COMMERCIAL